# Patient Record
Sex: MALE | Race: WHITE | HISPANIC OR LATINO | URBAN - METROPOLITAN AREA
[De-identification: names, ages, dates, MRNs, and addresses within clinical notes are randomized per-mention and may not be internally consistent; named-entity substitution may affect disease eponyms.]

---

## 2021-10-18 ENCOUNTER — NEW PATIENT COMPREHENSIVE (OUTPATIENT)
Dept: URBAN - METROPOLITAN AREA CLINIC 49 | Facility: CLINIC | Age: 86
End: 2021-10-18

## 2021-10-18 DIAGNOSIS — H02.886: ICD-10-CM

## 2021-10-18 DIAGNOSIS — H35.372: ICD-10-CM

## 2021-10-18 DIAGNOSIS — H35.363: ICD-10-CM

## 2021-10-18 DIAGNOSIS — H02.883: ICD-10-CM

## 2021-10-18 DIAGNOSIS — H40.013: ICD-10-CM

## 2021-10-18 DIAGNOSIS — H43.811: ICD-10-CM

## 2021-10-18 PROCEDURE — 92134 CPTRZ OPH DX IMG PST SGM RTA: CPT

## 2021-10-18 PROCEDURE — 92004 COMPRE OPH EXAM NEW PT 1/>: CPT

## 2021-10-18 ASSESSMENT — VISUAL ACUITY
OD_SC: J7
OU_SC: J5
OU_SC: 20/20
OD_GLARE: 20/50
OS_SC: 20/20
OS_GLARE: 20/40
OS_SC: J7
OD_SC: 20/30

## 2021-10-18 ASSESSMENT — TONOMETRY
OD_IOP_MMHG: 17
OS_IOP_MMHG: 16

## 2021-11-17 ENCOUNTER — PROBLEM (OUTPATIENT)
Dept: URBAN - METROPOLITAN AREA CLINIC 49 | Facility: CLINIC | Age: 86
End: 2021-11-17

## 2021-11-17 DIAGNOSIS — H01.00B: ICD-10-CM

## 2021-11-17 DIAGNOSIS — H02.883: ICD-10-CM

## 2021-11-17 DIAGNOSIS — H02.886: ICD-10-CM

## 2021-11-17 DIAGNOSIS — H01.00A: ICD-10-CM

## 2021-11-17 PROCEDURE — 92012 INTRM OPH EXAM EST PATIENT: CPT

## 2021-11-17 RX ORDER — NEOMYCIN SULFATE, POLYMYXIN B SULFATE AND DEXAMETHASONE 3.5; 10000; 1 MG/G; [USP'U]/G; MG/G
OINTMENT OPHTHALMIC EVERY EVENING
Start: 2021-11-17

## 2021-11-17 ASSESSMENT — VISUAL ACUITY
OS_PH: 20/25
OD_SC: 20/40-2
OS_SC: 20/30-2
OD_PH: 20/30

## 2021-11-17 ASSESSMENT — TONOMETRY
OS_IOP_MMHG: 08
OD_IOP_MMHG: 08

## 2021-11-17 NOTE — PATIENT DISCUSSION
Discussed with patient,  recommend maxitrol ointment at bedtime on bilateral lower lids.  Recommend patient increase artificial tears(preservative free) to 6 times a day.  sample of refresh omega 3 given.   will recheck in 2 weeks.

## 2022-03-17 ENCOUNTER — APPOINTMENT (RX ONLY)
Dept: URBAN - METROPOLITAN AREA CLINIC 162 | Facility: CLINIC | Age: 87
Setting detail: DERMATOLOGY
End: 2022-03-17

## 2022-03-17 DIAGNOSIS — L71.8 OTHER ROSACEA: ICD-10-CM | Status: INADEQUATELY CONTROLLED

## 2022-03-17 DIAGNOSIS — L81.4 OTHER MELANIN HYPERPIGMENTATION: ICD-10-CM

## 2022-03-17 DIAGNOSIS — L82.1 OTHER SEBORRHEIC KERATOSIS: ICD-10-CM

## 2022-03-17 DIAGNOSIS — Z71.89 OTHER SPECIFIED COUNSELING: ICD-10-CM

## 2022-03-17 DIAGNOSIS — D18.0 HEMANGIOMA: ICD-10-CM

## 2022-03-17 DIAGNOSIS — D22 MELANOCYTIC NEVI: ICD-10-CM

## 2022-03-17 DIAGNOSIS — Z12.83 ENCOUNTER FOR SCREENING FOR MALIGNANT NEOPLASM OF SKIN: ICD-10-CM

## 2022-03-17 DIAGNOSIS — L57.0 ACTINIC KERATOSIS: ICD-10-CM | Status: INADEQUATELY CONTROLLED

## 2022-03-17 PROBLEM — D22.9 MELANOCYTIC NEVI, UNSPECIFIED: Status: ACTIVE | Noted: 2022-03-17

## 2022-03-17 PROBLEM — D18.01 HEMANGIOMA OF SKIN AND SUBCUTANEOUS TISSUE: Status: ACTIVE | Noted: 2022-03-17

## 2022-03-17 PROCEDURE — ? LIQUID NITROGEN

## 2022-03-17 PROCEDURE — 99204 OFFICE O/P NEW MOD 45 MIN: CPT | Mod: 25

## 2022-03-17 PROCEDURE — 17000 DESTRUCT PREMALG LESION: CPT

## 2022-03-17 PROCEDURE — ? TREATMENT REGIMEN

## 2022-03-17 PROCEDURE — ? PRESCRIPTION

## 2022-03-17 PROCEDURE — ? COUNSELING

## 2022-03-17 PROCEDURE — 17003 DESTRUCT PREMALG LES 2-14: CPT

## 2022-03-17 PROCEDURE — ? ADDITIONAL NOTES

## 2022-03-17 RX ORDER — SODIUM SULFACETAMIDE AND SULFUR 80; 40 MG/ML; MG/ML
LOTION TOPICAL
Qty: 473 | Refills: 4 | Status: ERX | COMMUNITY
Start: 2022-03-17

## 2022-03-17 RX ADMIN — SODIUM SULFACETAMIDE AND SULFUR: 80; 40 LOTION TOPICAL at 00:00

## 2022-03-17 ASSESSMENT — LOCATION DETAILED DESCRIPTION DERM
LOCATION DETAILED: LEFT DISTAL DORSAL FOREARM
LOCATION DETAILED: LEFT SUPERIOR OCCIPITAL SCALP
LOCATION DETAILED: LEFT INFERIOR CENTRAL MALAR CHEEK
LOCATION DETAILED: RIGHT CYMBA CONCHA
LOCATION DETAILED: LEFT SUPERIOR CRUS OF ANTIHELIX
LOCATION DETAILED: LEFT ULNAR DORSAL HAND
LOCATION DETAILED: RIGHT PROXIMAL DORSAL FOREARM
LOCATION DETAILED: RIGHT ULNAR DORSAL HAND
LOCATION DETAILED: RIGHT CENTRAL MALAR CHEEK
LOCATION DETAILED: RIGHT SUPERIOR LATERAL MALAR CHEEK
LOCATION DETAILED: LEFT SUPERIOR LATERAL MALAR CHEEK
LOCATION DETAILED: LEFT SUPERIOR PARIETAL SCALP

## 2022-03-17 ASSESSMENT — LOCATION SIMPLE DESCRIPTION DERM
LOCATION SIMPLE: LEFT HAND
LOCATION SIMPLE: RIGHT FOREARM
LOCATION SIMPLE: RIGHT CHEEK
LOCATION SIMPLE: LEFT CHEEK
LOCATION SIMPLE: SCALP
LOCATION SIMPLE: RIGHT HAND
LOCATION SIMPLE: LEFT FOREARM
LOCATION SIMPLE: LEFT OCCIPITAL SCALP
LOCATION SIMPLE: LEFT EAR
LOCATION SIMPLE: LEFT CHEEK
LOCATION SIMPLE: RIGHT EAR

## 2022-03-17 ASSESSMENT — LOCATION ZONE DERM
LOCATION ZONE: EAR
LOCATION ZONE: FACE
LOCATION ZONE: ARM
LOCATION ZONE: FACE
LOCATION ZONE: SCALP
LOCATION ZONE: HAND

## 2022-03-17 NOTE — PROCEDURE: ADDITIONAL NOTES
Additional Notes: Patient consent was obtained to proceed with the visit and recommended plan of care after discussion of all risks and benefits, including the risks of COVID-19 exposure.
Detail Level: Simple
Additional Notes: Due to patients age will not treat all actinic keratoses at this time. Patient states that most are not bothersome, will treat the large and bothersome lesions at this time.
Render Risk Assessment In Note?: no

## 2022-03-17 NOTE — PROCEDURE: LIQUID NITROGEN
Number Of Freeze-Thaw Cycles: 3 freeze-thaw cycles
Post-Care Instructions: I reviewed with the patient in detail post-care instructions. Patient is to wear sunprotection, and avoid picking at any of the treated lesions. Pt may apply Vaseline to crusted or scabbing areas.
Duration Of Freeze Thaw-Cycle (Seconds): 10
Consent: The patient's consent was obtained including but not limited to risks of crusting, scabbing, blistering, scarring, darker or lighter pigmentary change, recurrence, incomplete removal and infection.
Detail Level: Detailed
Render Post-Care Instructions In Note?: yes
Render Note In Bullet Format When Appropriate: No

## 2023-09-14 ENCOUNTER — COMPREHENSIVE EXAM (OUTPATIENT)
Dept: URBAN - METROPOLITAN AREA CLINIC 49 | Facility: LOCATION | Age: 88
End: 2023-09-14

## 2023-09-14 DIAGNOSIS — H43.811: ICD-10-CM

## 2023-09-14 DIAGNOSIS — H40.013: ICD-10-CM

## 2023-09-14 DIAGNOSIS — H35.363: ICD-10-CM

## 2023-09-14 DIAGNOSIS — H53.2: ICD-10-CM

## 2023-09-14 DIAGNOSIS — L71.9: ICD-10-CM

## 2023-09-14 DIAGNOSIS — H35.373: ICD-10-CM

## 2023-09-14 DIAGNOSIS — H26.493: ICD-10-CM

## 2023-09-14 PROCEDURE — 76514 ECHO EXAM OF EYE THICKNESS: CPT

## 2023-09-14 PROCEDURE — 92134 CPTRZ OPH DX IMG PST SGM RTA: CPT

## 2023-09-14 PROCEDURE — 99214 OFFICE O/P EST MOD 30 MIN: CPT

## 2023-09-14 ASSESSMENT — PACHYMETRY
OS_CT_UM: 483
OD_CT_UM: 471

## 2023-09-14 ASSESSMENT — VISUAL ACUITY
OS_SC: 20/30
OU_SC: 20/30
OD_GLARE: 20/70
OS_GLARE: 20/60
OU_CC: J1+
OD_SC: 20/30

## 2023-09-14 ASSESSMENT — TONOMETRY
OD_IOP_MMHG: 14
OS_IOP_MMHG: 15
OD_IOP_MMHG: 19
OS_IOP_MMHG: 19

## 2024-04-05 ENCOUNTER — DIAGNOSTICS ONLY (OUTPATIENT)
Dept: URBAN - METROPOLITAN AREA CLINIC 48 | Facility: LOCATION | Age: 89
End: 2024-04-05

## 2024-04-05 DIAGNOSIS — H40.013: ICD-10-CM

## 2024-04-05 PROCEDURE — 92083 EXTENDED VISUAL FIELD XM: CPT

## 2024-04-05 PROCEDURE — 92133 CPTRZD OPH DX IMG PST SGM ON: CPT

## 2025-05-08 ENCOUNTER — COMPREHENSIVE EXAM (OUTPATIENT)
Age: OVER 89
End: 2025-05-08

## 2025-05-08 DIAGNOSIS — H02.884: ICD-10-CM

## 2025-05-08 DIAGNOSIS — H43.811: ICD-10-CM

## 2025-05-08 DIAGNOSIS — H35.363: ICD-10-CM

## 2025-05-08 DIAGNOSIS — L71.9: ICD-10-CM

## 2025-05-08 DIAGNOSIS — H26.493: ICD-10-CM

## 2025-05-08 DIAGNOSIS — H02.881: ICD-10-CM

## 2025-05-08 DIAGNOSIS — H40.1131: ICD-10-CM

## 2025-05-08 DIAGNOSIS — H35.373: ICD-10-CM

## 2025-05-08 PROCEDURE — 92134 CPTRZ OPH DX IMG PST SGM RTA: CPT

## 2025-05-08 PROCEDURE — 99214 OFFICE O/P EST MOD 30 MIN: CPT

## 2025-05-08 RX ORDER — LATANOPROST 50 UG/ML
1 SOLUTION/ DROPS OPHTHALMIC EVERY EVENING
Start: 2025-05-08

## 2025-06-24 ENCOUNTER — CONTACT LENSES/GLASSES VISIT (OUTPATIENT)
Age: OVER 89
End: 2025-06-24

## 2025-06-24 DIAGNOSIS — H04.123: ICD-10-CM

## 2025-06-24 DIAGNOSIS — H53.2: ICD-10-CM

## 2025-06-24 PROCEDURE — 92060 SENSORIMOTOR EXAMINATION: CPT

## 2025-06-24 PROCEDURE — 92015 DETERMINE REFRACTIVE STATE: CPT
